# Patient Record
Sex: FEMALE | Race: WHITE
[De-identification: names, ages, dates, MRNs, and addresses within clinical notes are randomized per-mention and may not be internally consistent; named-entity substitution may affect disease eponyms.]

---

## 2019-04-12 ENCOUNTER — HOSPITAL ENCOUNTER (OUTPATIENT)
Dept: HOSPITAL 47 - RADXRMAIN | Age: 84
Discharge: HOME | End: 2019-04-12
Attending: INTERNAL MEDICINE
Payer: MEDICARE

## 2019-04-12 DIAGNOSIS — R05: Primary | ICD-10-CM

## 2019-04-12 PROCEDURE — 71046 X-RAY EXAM CHEST 2 VIEWS: CPT

## 2019-04-12 NOTE — XR
EXAMINATION TYPE: XR chest 2V

 

DATE OF EXAM: 4/12/2019

 

COMPARISON: NONE

 

HISTORY: Shortness of breath

 

TECHNIQUE:  Frontal and lateral views of the chest are obtained.

 

FINDINGS:

 

Scattered senescent parenchymal changes noted. Hyperinflation compatible with COPD. 

 

No evidence for infiltrate. No evidence for atelectasis.

 

Heart size is stable.

 

Mediastinal structures are stable and grossly unremarkable.

 

No evidence for hilar prominence.

 

Degenerative changes dorsal spine. 

 

IMPRESSION:

1. No evidence for acute pulmonary disease.

## 2020-02-17 ENCOUNTER — HOSPITAL ENCOUNTER (OUTPATIENT)
Dept: HOSPITAL 47 - EC | Age: 85
Setting detail: OBSERVATION
LOS: 2 days | Discharge: NURSING, REHAB OR LONG-TERM CARE | End: 2020-02-19
Attending: HOSPITALIST | Admitting: HOSPITALIST
Payer: MEDICARE

## 2020-02-17 DIAGNOSIS — Z80.0: ICD-10-CM

## 2020-02-17 DIAGNOSIS — I27.20: ICD-10-CM

## 2020-02-17 DIAGNOSIS — Z79.01: ICD-10-CM

## 2020-02-17 DIAGNOSIS — Z88.5: ICD-10-CM

## 2020-02-17 DIAGNOSIS — N18.3: ICD-10-CM

## 2020-02-17 DIAGNOSIS — R91.1: ICD-10-CM

## 2020-02-17 DIAGNOSIS — E66.01: ICD-10-CM

## 2020-02-17 DIAGNOSIS — K58.9: ICD-10-CM

## 2020-02-17 DIAGNOSIS — I50.9: ICD-10-CM

## 2020-02-17 DIAGNOSIS — E87.6: ICD-10-CM

## 2020-02-17 DIAGNOSIS — I48.19: ICD-10-CM

## 2020-02-17 DIAGNOSIS — F41.9: ICD-10-CM

## 2020-02-17 DIAGNOSIS — M48.00: ICD-10-CM

## 2020-02-17 DIAGNOSIS — Z95.0: ICD-10-CM

## 2020-02-17 DIAGNOSIS — J84.9: ICD-10-CM

## 2020-02-17 DIAGNOSIS — M17.0: ICD-10-CM

## 2020-02-17 DIAGNOSIS — Z79.899: ICD-10-CM

## 2020-02-17 DIAGNOSIS — D69.6: ICD-10-CM

## 2020-02-17 DIAGNOSIS — Z90.49: ICD-10-CM

## 2020-02-17 DIAGNOSIS — Z98.42: ICD-10-CM

## 2020-02-17 DIAGNOSIS — Z96.1: ICD-10-CM

## 2020-02-17 DIAGNOSIS — J44.9: ICD-10-CM

## 2020-02-17 DIAGNOSIS — N39.3: ICD-10-CM

## 2020-02-17 DIAGNOSIS — I49.5: ICD-10-CM

## 2020-02-17 DIAGNOSIS — E11.22: ICD-10-CM

## 2020-02-17 DIAGNOSIS — Z79.82: ICD-10-CM

## 2020-02-17 DIAGNOSIS — K21.9: ICD-10-CM

## 2020-02-17 DIAGNOSIS — E03.9: ICD-10-CM

## 2020-02-17 DIAGNOSIS — F32.9: ICD-10-CM

## 2020-02-17 DIAGNOSIS — Z98.41: ICD-10-CM

## 2020-02-17 DIAGNOSIS — D64.9: Primary | ICD-10-CM

## 2020-02-17 DIAGNOSIS — Z96.651: ICD-10-CM

## 2020-02-17 PROCEDURE — 86850 RBC ANTIBODY SCREEN: CPT

## 2020-02-17 PROCEDURE — 82607 VITAMIN B-12: CPT

## 2020-02-17 PROCEDURE — 83735 ASSAY OF MAGNESIUM: CPT

## 2020-02-17 PROCEDURE — 83615 LACTATE (LD) (LDH) ENZYME: CPT

## 2020-02-17 PROCEDURE — 86334 IMMUNOFIX E-PHORESIS SERUM: CPT

## 2020-02-17 PROCEDURE — 99285 EMERGENCY DEPT VISIT HI MDM: CPT

## 2020-02-17 PROCEDURE — 83921 ORGANIC ACID SINGLE QUANT: CPT

## 2020-02-17 PROCEDURE — 80053 COMPREHEN METABOLIC PANEL: CPT

## 2020-02-17 PROCEDURE — 84165 PROTEIN E-PHORESIS SERUM: CPT

## 2020-02-17 PROCEDURE — 85730 THROMBOPLASTIN TIME PARTIAL: CPT

## 2020-02-17 PROCEDURE — 86900 BLOOD TYPING SEROLOGIC ABO: CPT

## 2020-02-17 PROCEDURE — 83010 ASSAY OF HAPTOGLOBIN QUANT: CPT

## 2020-02-17 PROCEDURE — 93005 ELECTROCARDIOGRAM TRACING: CPT

## 2020-02-17 PROCEDURE — 83540 ASSAY OF IRON: CPT

## 2020-02-17 PROCEDURE — 87205 SMEAR GRAM STAIN: CPT

## 2020-02-17 PROCEDURE — 82728 ASSAY OF FERRITIN: CPT

## 2020-02-17 PROCEDURE — 82747 ASSAY OF FOLIC ACID RBC: CPT

## 2020-02-17 PROCEDURE — 36415 COLL VENOUS BLD VENIPUNCTURE: CPT

## 2020-02-17 PROCEDURE — 87070 CULTURE OTHR SPECIMN AEROBIC: CPT

## 2020-02-17 PROCEDURE — 83883 ASSAY NEPHELOMETRY NOT SPEC: CPT

## 2020-02-17 PROCEDURE — 85045 AUTOMATED RETICULOCYTE COUNT: CPT

## 2020-02-17 PROCEDURE — 85025 COMPLETE CBC W/AUTO DIFF WBC: CPT

## 2020-02-17 PROCEDURE — 87075 CULTR BACTERIA EXCEPT BLOOD: CPT

## 2020-02-17 PROCEDURE — 85384 FIBRINOGEN ACTIVITY: CPT

## 2020-02-17 PROCEDURE — 97161 PT EVAL LOW COMPLEX 20 MIN: CPT

## 2020-02-17 PROCEDURE — 83550 IRON BINDING TEST: CPT

## 2020-02-17 PROCEDURE — 71045 X-RAY EXAM CHEST 1 VIEW: CPT

## 2020-02-17 PROCEDURE — 87077 CULTURE AEROBIC IDENTIFY: CPT

## 2020-02-17 PROCEDURE — 80048 BASIC METABOLIC PNL TOTAL CA: CPT

## 2020-02-17 PROCEDURE — 86901 BLOOD TYPING SEROLOGIC RH(D): CPT

## 2020-02-17 PROCEDURE — 82164 ANGIOTENSIN I ENZYME TEST: CPT

## 2020-02-17 PROCEDURE — 87186 SC STD MICRODIL/AGAR DIL: CPT

## 2020-02-17 PROCEDURE — 85610 PROTHROMBIN TIME: CPT

## 2020-02-18 LAB
ALBUMIN SERPL-MCNC: 2.9 G/DL (ref 3.5–5)
ALBUMIN SERPL-MCNC: 3.1 G/DL (ref 3.5–5)
ALP SERPL-CCNC: 75 U/L (ref 38–126)
ALP SERPL-CCNC: 97 U/L (ref 38–126)
ALT SERPL-CCNC: 17 U/L (ref 4–34)
ALT SERPL-CCNC: 18 U/L (ref 4–34)
ANION GAP SERPL CALC-SCNC: 3 MMOL/L
ANION GAP SERPL CALC-SCNC: 8 MMOL/L
APTT BLD: 23.5 SEC (ref 22–30)
AST SERPL-CCNC: 27 U/L (ref 14–36)
AST SERPL-CCNC: 30 U/L (ref 14–36)
BASOPHILS # BLD AUTO: 0 K/UL (ref 0–0.2)
BASOPHILS # BLD AUTO: 0 K/UL (ref 0–0.2)
BASOPHILS NFR BLD AUTO: 0 %
BASOPHILS NFR BLD AUTO: 0 %
BUN SERPL-SCNC: 27 MG/DL (ref 7–17)
BUN SERPL-SCNC: 31 MG/DL (ref 7–17)
CALCIUM SPEC-MCNC: 8.5 MG/DL (ref 8.4–10.2)
CALCIUM SPEC-MCNC: 8.6 MG/DL (ref 8.4–10.2)
CHLORIDE SERPL-SCNC: 106 MMOL/L (ref 98–107)
CHLORIDE SERPL-SCNC: 107 MMOL/L (ref 98–107)
CO2 SERPL-SCNC: 27 MMOL/L (ref 22–30)
CO2 SERPL-SCNC: 29 MMOL/L (ref 22–30)
EOSINOPHIL # BLD AUTO: 0 K/UL (ref 0–0.7)
EOSINOPHIL # BLD AUTO: 0.1 K/UL (ref 0–0.7)
EOSINOPHIL NFR BLD AUTO: 0 %
EOSINOPHIL NFR BLD AUTO: 1 %
ERYTHROCYTE [DISTWIDTH] IN BLOOD BY AUTOMATED COUNT: 3.28 M/UL (ref 3.8–5.4)
ERYTHROCYTE [DISTWIDTH] IN BLOOD BY AUTOMATED COUNT: 3.3 M/UL (ref 3.8–5.4)
ERYTHROCYTE [DISTWIDTH] IN BLOOD: 17.6 % (ref 11.5–15.5)
ERYTHROCYTE [DISTWIDTH] IN BLOOD: 17.9 % (ref 11.5–15.5)
FERRITIN SERPL-MCNC: 57 NG/ML (ref 10–291)
FIBRINOGEN PPP-MCNC: 241 MG/DL (ref 200–500)
GLUCOSE SERPL-MCNC: 108 MG/DL (ref 74–99)
GLUCOSE SERPL-MCNC: 111 MG/DL (ref 74–99)
HCT VFR BLD AUTO: 28.4 % (ref 34–46)
HCT VFR BLD AUTO: 28.9 % (ref 34–46)
HGB BLD-MCNC: 8.8 GM/DL (ref 11.4–16)
HGB BLD-MCNC: 9.1 GM/DL (ref 11.4–16)
INR PPP: 2 (ref ?–1.2)
IRON SERPL-MCNC: 7 UG/DL (ref 50–170)
LYMPHOCYTES # SPEC AUTO: 1.8 K/UL (ref 1–4.8)
LYMPHOCYTES # SPEC AUTO: 2.1 K/UL (ref 1–4.8)
LYMPHOCYTES NFR SPEC AUTO: 28 %
LYMPHOCYTES NFR SPEC AUTO: 30 %
MAGNESIUM SPEC-SCNC: 1.8 MG/DL (ref 1.6–2.3)
MCH RBC QN AUTO: 26.8 PG (ref 25–35)
MCH RBC QN AUTO: 27.5 PG (ref 25–35)
MCHC RBC AUTO-ENTMCNC: 30.9 G/DL (ref 31–37)
MCHC RBC AUTO-ENTMCNC: 31.4 G/DL (ref 31–37)
MCV RBC AUTO: 86.6 FL (ref 80–100)
MCV RBC AUTO: 87.6 FL (ref 80–100)
MONOCYTES # BLD AUTO: 0.2 K/UL (ref 0–1)
MONOCYTES # BLD AUTO: 0.3 K/UL (ref 0–1)
MONOCYTES NFR BLD AUTO: 4 %
MONOCYTES NFR BLD AUTO: 5 %
NEUTROPHILS # BLD AUTO: 4.2 K/UL (ref 1.3–7.7)
NEUTROPHILS # BLD AUTO: 4.3 K/UL (ref 1.3–7.7)
NEUTROPHILS NFR BLD AUTO: 60 %
NEUTROPHILS NFR BLD AUTO: 65 %
PLATELET # BLD AUTO: 57 K/UL (ref 150–450)
PLATELET # BLD AUTO: 68 K/UL (ref 150–450)
POTASSIUM SERPL-SCNC: 3.2 MMOL/L (ref 3.5–5.1)
POTASSIUM SERPL-SCNC: 3.5 MMOL/L (ref 3.5–5.1)
PROT SERPL-MCNC: 5.7 G/DL (ref 6.3–8.2)
PROT SERPL-MCNC: 5.9 G/DL (ref 6.3–8.2)
PT BLD: 19.8 SEC (ref 9–12)
SODIUM SERPL-SCNC: 139 MMOL/L (ref 137–145)
SODIUM SERPL-SCNC: 141 MMOL/L (ref 137–145)
TIBC SERPL-MCNC: 320 UG/DL (ref 228–460)
VIT B12 SERPL-MCNC: 391 PG/ML (ref 200–944)
WBC # BLD AUTO: 6.4 K/UL (ref 3.8–10.6)
WBC # BLD AUTO: 7.1 K/UL (ref 3.8–10.6)

## 2020-02-18 RX ADMIN — METOPROLOL TARTRATE SCH MG: 50 TABLET, FILM COATED ORAL at 20:39

## 2020-02-18 RX ADMIN — POTASSIUM CHLORIDE SCH MEQ: 20 TABLET, EXTENDED RELEASE ORAL at 04:49

## 2020-02-18 RX ADMIN — POTASSIUM CHLORIDE SCH MEQ: 20 TABLET, EXTENDED RELEASE ORAL at 03:46

## 2020-02-18 NOTE — XR
EXAMINATION TYPE: XR chest 1V portable

 

DATE OF EXAM: 2/18/2020

 

COMPARISON: NONE

 

HISTORY: 4/12/2019

 

TECHNIQUE: Single view

 

FINDINGS: There is some airspace infiltrate and pleural fluid in the right lower lobe. Heart is enlar
ged. There is a left axillary pacemaker. There is no heart failure. Left lung is clear.

 

IMPRESSION: There is right lower lobe pneumonia and right pleural fluid that appears new compared to 
old exam. No heart failure seen.

## 2020-02-18 NOTE — ED
Recheck HPI





- General


Chief Complaint: Recheck/Abnormal Lab/Rx


Stated Complaint: abnormal labs


Time Seen by Provider: 02/17/20 23:28


Source: EMS


Mode of arrival: EMS


Limitations: no limitations





- History of Present Illness


Initial Comments: 


Liza is an 85-year-old female presents to ER today from [facility] for 

evaluation of newly identified thrombocytopenia and anemia.  Patient is 

currently been undergoing evaluation of a lung mass and recently was diagnosed 

with what she says is a uncurable lung disease however staff at the nursing 

facility reported that she has terminal lung cancer.  Patient reports that over 

the past week she's noticed bruising of her legs and has had generalized 

weakness.  Physician at her facility ordered labs and CBC resulted with new 

onset of anemia and thrombocytopenia of unknown etiology.  Patient is also noted

to be per family hypokalemic with potassium of only 2.9 earlier.  At that time 

patient was transferred to our facility for further evaluation.  Patient does 

admit to feeling pain at her recent biopsy site as well as generalized malaise.








Review of Systems


ROS Statement: 


Those systems with pertinent positive or pertinent negative responses have been 

documented in the HPI.





ROS Other: All systems not noted in ROS Statement are negative.





Past Medical History


Additional Past Medical History / Comment(s): afib, lung cx.


History of Any Multi-Drug Resistant Organisms: None Reported


Additional Past Surgical History / Comment(s): pacemaker


Past Psychological History: No Psychological Hx Reported


Smoking Status: Never smoker


Past Alcohol Use History: None Reported


Past Drug Use History: None Reported





General Exam





- General Exam Comments


Initial Comments: 


Physical Exam


GENERAL:


Elderly chronically ill-appearing female 





HENT:


Normocephalic, Atraumatic. 





EYES:


PERRL, EOMI


Conjunctival pallor





PULMONARY:


Unlabored respirations.  


Crackles on right





CARDIOVASCULAR:


There is a regular rate and rhythm without any murmurs gallops or rubs.  





ABDOMEN:


Soft and nontender with normal bowel sounds. 





SKIN:


Petechia thighs, bruising on bilateral calves


Senile purpura noted on bilateral upper extremities


Patient is pale





: 


Deferred





NEUROLOGIC:


Patient is alert and oriented x3.  Moving all extremities spontaneously





MUSCULOSKELETAL:


Normal extremities with adequate strength and full range of motion.  No lower 

extremity swelling or edema.  No calf tenderness.  





PSYCHIATRIC:


Appropriate situational depression





Limitations: no limitations





Course


                                   Vital Signs











  02/17/20





  23:35


 


Temperature 98.6 F


 


Pulse Rate 66


 


Respiratory 18





Rate 


 


Blood Pressure 147/75


 


O2 Sat by Pulse 96





Oximetry 














Medical Decision Making





- Medical Decision Making


The patient was seen and evaluated history is obtained from the patient and 

review of medical record


History and physical exam concerning for anemia Nuance of thrombus cytopenia


Repeat labs confirmed anemia with thrombocytopenia


Patient will be admitted for further evaluation by oncology








- Lab Data


Result diagrams: 


                                 02/18/20 00:03





                                 02/18/20 00:03


                                   Lab Results











  02/18/20 02/18/20 02/18/20 Range/Units





  00:01 00:03 00:03 


 


WBC    7.1  (3.8-10.6)  k/uL


 


RBC    3.28 L  (3.80-5.40)  m/uL


 


Hgb    8.8 L  (11.4-16.0)  gm/dL


 


Hct    28.4 L  (34.0-46.0)  %


 


MCV    86.6  (80.0-100.0)  fL


 


MCH    26.8  (25.0-35.0)  pg


 


MCHC    30.9 L  (31.0-37.0)  g/dL


 


RDW    17.6 H  (11.5-15.5)  %


 


Plt Count    57 L  (150-450)  k/uL


 


Neutrophils %    60  %


 


Lymphocytes %    30  %


 


Monocytes %    5  %


 


Eosinophils %    0  %


 


Basophils %    0  %


 


Neutrophils #    4.3  (1.3-7.7)  k/uL


 


Lymphocytes #    2.1  (1.0-4.8)  k/uL


 


Monocytes #    0.3  (0-1.0)  k/uL


 


Eosinophils #    0.0  (0-0.7)  k/uL


 


Basophils #    0.0  (0-0.2)  k/uL


 


Manual Slide Review    Performed  


 


Large Platelets    Present  


 


Polychromasia    Present  


 


Hypochromasia    Slight  


 


Poikilocytosis    Slight  


 


Anisocytosis    Slight  


 


Sodium     (137-145)  mmol/L


 


Potassium     (3.5-5.1)  mmol/L


 


Chloride     ()  mmol/L


 


Carbon Dioxide     (22-30)  mmol/L


 


Anion Gap     mmol/L


 


BUN     (7-17)  mg/dL


 


Creatinine     (0.52-1.04)  mg/dL


 


Est GFR (CKD-EPI)AfAm     (>60 ml/min/1.73 sqM)  


 


Est GFR (CKD-EPI)NonAf     (>60 ml/min/1.73 sqM)  


 


Glucose     (74-99)  mg/dL


 


Calcium     (8.4-10.2)  mg/dL


 


Magnesium     (1.6-2.3)  mg/dL


 


Total Bilirubin     (0.2-1.3)  mg/dL


 


AST     (14-36)  U/L


 


ALT     (4-34)  U/L


 


Alkaline Phosphatase     ()  U/L


 


Total Protein     (6.3-8.2)  g/dL


 


Albumin     (3.5-5.0)  g/dL


 


Blood Type   O Positive   


 


Blood Type Confirm  O Positive    


 


Blood Type Recheck   No Previous Record   


 


Bld Type Recheck Status   CABO Indicated   


 


Antibody Screen   NEGATIVE   


 


Spec Expiration Date   02/21/2020 - 2303 02/18/20 Range/Units





  00:03 


 


WBC   (3.8-10.6)  k/uL


 


RBC   (3.80-5.40)  m/uL


 


Hgb   (11.4-16.0)  gm/dL


 


Hct   (34.0-46.0)  %


 


MCV   (80.0-100.0)  fL


 


MCH   (25.0-35.0)  pg


 


MCHC   (31.0-37.0)  g/dL


 


RDW   (11.5-15.5)  %


 


Plt Count   (150-450)  k/uL


 


Neutrophils %   %


 


Lymphocytes %   %


 


Monocytes %   %


 


Eosinophils %   %


 


Basophils %   %


 


Neutrophils #   (1.3-7.7)  k/uL


 


Lymphocytes #   (1.0-4.8)  k/uL


 


Monocytes #   (0-1.0)  k/uL


 


Eosinophils #   (0-0.7)  k/uL


 


Basophils #   (0-0.2)  k/uL


 


Manual Slide Review   


 


Large Platelets   


 


Polychromasia   


 


Hypochromasia   


 


Poikilocytosis   


 


Anisocytosis   


 


Sodium  141  (137-145)  mmol/L


 


Potassium  3.2 L  (3.5-5.1)  mmol/L


 


Chloride  106  ()  mmol/L


 


Carbon Dioxide  27  (22-30)  mmol/L


 


Anion Gap  8  mmol/L


 


BUN  31 H  (7-17)  mg/dL


 


Creatinine  1.21 H  (0.52-1.04)  mg/dL


 


Est GFR (CKD-EPI)AfAm  48  (>60 ml/min/1.73 sqM)  


 


Est GFR (CKD-EPI)NonAf  42  (>60 ml/min/1.73 sqM)  


 


Glucose  111 H  (74-99)  mg/dL


 


Calcium  8.5  (8.4-10.2)  mg/dL


 


Magnesium  1.8  (1.6-2.3)  mg/dL


 


Total Bilirubin  0.6  (0.2-1.3)  mg/dL


 


AST  27  (14-36)  U/L


 


ALT  18  (4-34)  U/L


 


Alkaline Phosphatase  97  ()  U/L


 


Total Protein  5.9 L  (6.3-8.2)  g/dL


 


Albumin  3.1 L  (3.5-5.0)  g/dL


 


Blood Type   


 


Blood Type Confirm   


 


Blood Type Recheck   


 


Bld Type Recheck Status   


 


Antibody Screen   


 


Spec Expiration Date   














Disposition


Clinical Impression: 


 Hypokalemia, Thrombocytopenia, Anemia





Disposition: ADMITTED AS IP TO THIS [facility]


Condition: Serious


Is patient prescribed a controlled substance at d/c from ED?: No


Referrals: 


Wilver Franco MD [Primary Care Provider] - 1-2 days

## 2020-02-19 VITALS — HEART RATE: 74 BPM | SYSTOLIC BLOOD PRESSURE: 122 MMHG | TEMPERATURE: 98 F | DIASTOLIC BLOOD PRESSURE: 74 MMHG

## 2020-02-19 VITALS — RESPIRATION RATE: 20 BRPM

## 2020-02-19 LAB
ALBUMIN SERPL ELPH-MCNC: 2.59 G/DL (ref 3.8–4.9)
ANION GAP SERPL CALC-SCNC: 8 MMOL/L
BASOPHILS # BLD AUTO: 0 K/UL (ref 0–0.2)
BASOPHILS NFR BLD AUTO: 0 %
BUN SERPL-SCNC: 21 MG/DL (ref 7–17)
CALCIUM SPEC-MCNC: 8.4 MG/DL (ref 8.4–10.2)
CHLORIDE SERPL-SCNC: 105 MMOL/L (ref 98–107)
CO2 SERPL-SCNC: 26 MMOL/L (ref 22–30)
EOSINOPHIL # BLD AUTO: 0 K/UL (ref 0–0.7)
EOSINOPHIL NFR BLD AUTO: 1 %
ERYTHROCYTE [DISTWIDTH] IN BLOOD BY AUTOMATED COUNT: 3.29 M/UL (ref 3.8–5.4)
ERYTHROCYTE [DISTWIDTH] IN BLOOD: 17.9 % (ref 11.5–15.5)
GAMMA GLOB SERPL ELPH-MCNC: 0.85 G/DL (ref 0.7–1.5)
GLUCOSE SERPL-MCNC: 110 MG/DL (ref 74–99)
HCT VFR BLD AUTO: 28.7 % (ref 34–46)
HGB BLD-MCNC: 9.1 GM/DL (ref 11.4–16)
INR PPP: 1.7 (ref ?–1.2)
KAPPA LC FREE SER NEPH-MCNC: 3.1 MG/DL (ref 0.33–1.94)
LYMPHOCYTES # SPEC AUTO: 2.6 K/UL (ref 1–4.8)
LYMPHOCYTES NFR SPEC AUTO: 35 %
MCH RBC QN AUTO: 27.7 PG (ref 25–35)
MCHC RBC AUTO-ENTMCNC: 31.8 G/DL (ref 31–37)
MCV RBC AUTO: 87.2 FL (ref 80–100)
MONOCYTES # BLD AUTO: 0.3 K/UL (ref 0–1)
MONOCYTES NFR BLD AUTO: 4 %
NEUTROPHILS # BLD AUTO: 4.2 K/UL (ref 1.3–7.7)
NEUTROPHILS NFR BLD AUTO: 56 %
PLATELET # BLD AUTO: 83 K/UL (ref 150–450)
POTASSIUM SERPL-SCNC: 3.4 MMOL/L (ref 3.5–5.1)
PT BLD: 16.5 SEC (ref 9–12)
SODIUM SERPL-SCNC: 139 MMOL/L (ref 137–145)
WBC # BLD AUTO: 7.5 K/UL (ref 3.8–10.6)

## 2020-02-19 RX ADMIN — IPRATROPIUM BROMIDE SCH: 0.5 SOLUTION RESPIRATORY (INHALATION) at 11:40

## 2020-02-19 RX ADMIN — METOPROLOL TARTRATE SCH MG: 50 TABLET, FILM COATED ORAL at 08:46

## 2020-02-19 RX ADMIN — IPRATROPIUM BROMIDE SCH: 0.5 SOLUTION RESPIRATORY (INHALATION) at 08:23

## 2020-02-19 NOTE — P.DS
Providers


Date of admission: 


02/18/20 02:34





Expected date of discharge: 02/19/20


Attending physician: 


Chago Eastman





Consults: 





                                        





02/18/20 02:35


Consult Physician Urgent 


   Consulting Provider: Ihsan Garcia


   Consult Reason/Comments: thrombocytopenia, anemia recent lung CA diagnosis


   Do you want consulting provider notified?: Yes, Notify in am











Primary care physician: 


Wilver Franco





[facility] Course: 





Presenting complaint:


Bruising





History of presenting complaint:


This is a 86yo patient off Dr. Franco.  Chronic stable medical conditions include

atypical fibrillation, congestive heart failure, COPD, GERD, Jennifer arthritis, 

hypothyroid, interstitial lung disease, secondary probably hypertension, 

osteoarthritis, irritable bowel syndrome, urinary stress incontinence 6 sinus 

syndrome with a pacemaker.


Patient presented with bruising was found to have anemia and thrombocytopenia.  

Patient seen by Dr. Radha mas from hematology.


Today-sitting on the bed.  Comfortable.  No new bruising.  Labs have been 

stable.  I communicated with Venessa samano from oncology hematology team.  

Patient okay to be discharged.  They will follow-up with her in the office.  

This was discussed with the patient.  Questions were answered.


Discussion and discharge planning more than 35 minutes





On examination:


98, 74, 20, 122/74, 93% room air


Lungs-decreased breath sounds


Dermatological-some bruising


Psych-AO - times three.  Mood and affect normal





INVESTIGATIONS, reviewed in the clinical context:


White count 7.5 hemoglobin 9.1 platelets 83 INR 1.7


Potassium 3.4 creatinine 0.87


 vitamin B12 391


Free, power light chain elevated at 3.1


Free lambda light change elevated at 4.12


I am 7, percent saturation 2.1





Assessment:


-Bicytopenia, cause unclear at this point further workup as an outpatient


-Iron deficiency anemia


-Medical debility-uses a walker


-Chronic urinary stress incontinence


-Irritable bowel syndrome


-Hypothyroid


-6 sinus syndrome with pacemaker


-Secondary probably hypertension


-Primary osteoarthritis patient of the knees


-Spinal stenosis causing low back pain


-Interstitial lung disease


-GERD


-Persistent atrial fibrillation chronically on anticoagulation





Plan:


Patient will return to the ECF.  Aspirin is being held.  Follow-up with Dr. Garcia 

and repeat labs as an outpatient.





Patient Condition at Discharge: Stable





Plan - Discharge Summary


Discharge Rx Participant: No


New Discharge Prescriptions: 


Continue


   Potassium Chloride [Klor-Con 20] 20 meq PO AS DIRECTED


   Magnesium Hydroxide [Milk of Magnesia] 2,400 mg PO DAILY PRN


     PRN Reason: Constipation


   Na Phos,M-B/Na Phos,Di-Ba [Fleet Adult] 133 ml RECTAL DAILY PRN


     PRN Reason: Constipation


   Bisacodyl [Dulcolax] 10 mg RECTAL DAILY PRN


     PRN Reason: Constipation


   Acetaminophen Tab [Tylenol] 650 mg PO Q6H PRN


     PRN Reason: general discomfort


   Metoprolol Tartrate [Lopressor] 50 mg PO BID


   Cholecalciferol [Vitamin D3 (25 Mcg = 1000 Iu)] 1,000 unit PO DAILY


   Sertraline [Zoloft] 150 mg PO DAILY


   Pantoprazole Sodium [Protonix] 40 mg PO DAILY@0600


   predniSONE [Deltasone] 20 mg PO DAILY


   Potassium Chloride [Klor-Con 10] 10 meq PO DAILY@1700


   Losartan [Cozaar] 50 mg PO DAILY


   Atorvastatin [Lipitor] 10 mg PO HS


   Levothyroxine Sodium [Levoxyl] 88 mcg PO DAILY@0600


   Furosemide [Lasix] 20 mg PO DAILY


   Digoxin [Lanoxin] 125 mcg PO DAILY@0600


   Warfarin [Coumadin] 2.5 mg PO DAILY@1700


   Diltiazem HCl [Cardizem LA] 180 mg PO DAILY


   Umeclidinium Brm/Vilanterol Tr [Anoro Ellipta 62.5-25 Mcg INH] 1 puff 

INHALATION DAILY


   Phenyleph/Pramoxin/Glycr/W.pet [Preparation H Cream] 1 applic RECTAL QID PRN


     PRN Reason: Hemorrhoids





Discontinued


   Aspirin 81 mg PO DAILY


Discharge Medication List





Acetaminophen Tab [Tylenol] 650 mg PO Q6H PRN 02/18/20 [History]


Atorvastatin [Lipitor] 10 mg PO HS 02/18/20 [History]


Bisacodyl [Dulcolax] 10 mg RECTAL DAILY PRN 02/18/20 [History]


Cholecalciferol [Vitamin D3 (25 Mcg = 1000 Iu)] 1,000 unit PO DAILY 02/18/20 

[History]


Digoxin [Lanoxin] 125 mcg PO DAILY@0600 02/18/20 [History]


Diltiazem HCl [Cardizem LA] 180 mg PO DAILY 02/18/20 [History]


Furosemide [Lasix] 20 mg PO DAILY 02/18/20 [History]


Levothyroxine Sodium [Levoxyl] 88 mcg PO DAILY@0600 02/18/20 [History]


Losartan [Cozaar] 50 mg PO DAILY 02/18/20 [History]


Magnesium Hydroxide [Milk of Magnesia] 2,400 mg PO DAILY PRN 02/18/20 [History]


Metoprolol Tartrate [Lopressor] 50 mg PO BID 02/18/20 [History]


Na Phos,M-B/Na Phos,Di-Ba [Fleet Adult] 133 ml RECTAL DAILY PRN 02/18/20 

[History]


Pantoprazole Sodium [Protonix] 40 mg PO DAILY@0600 02/18/20 [History]


Phenyleph/Pramoxin/Glycr/W.pet [Preparation H Cream] 1 applic RECTAL QID PRN 02/ 18/20 [History]


Potassium Chloride [Klor-Con 10] 10 meq PO DAILY@1700 02/18/20 [History]


Potassium Chloride [Klor-Con 20] 20 meq PO AS DIRECTED 02/18/20 [History]


Sertraline [Zoloft] 150 mg PO DAILY 02/18/20 [History]


Umeclidinium Brm/Vilanterol Tr [Anoro Ellipta 62.5-25 Mcg INH] 1 puff INHALATION

DAILY 02/18/20 [History]


Warfarin [Coumadin] 2.5 mg PO DAILY@1700 02/18/20 [History]


predniSONE [Deltasone] 20 mg PO DAILY 02/18/20 [History]








Follow up Appointment(s)/Referral(s): 


Ihsan Garcia MD [STAFF PHYSICIAN] - 1 Week


Wilver Franco MD [Primary Care Provider] - 1 Week


Patient Instructions/Handouts:  Anemia (DC), Thrombocytopenia (DC)


Activity/Diet/Wound Care/Special Instructions: 


cbc  - next monday/dr garcia


Discharge Disposition: TRANSFER TO SNF/ECF

## 2024-03-30 NOTE — P.CONS
History of Present Illness





- Reason for Consult


Consult date: 20


bicytopenia


Requesting physician: Clarice Sheldon





- Chief Complaint


abnormal lab





- History of Present Illness





Mrs. Perry is a very pleasant 85-year-old  female patient sent from 

[facility] that she was staying at for rehabilitation due to recent 

frequent hospitalizations.  Recently had pneumonia, had lung biopsy, with a 

diagnosis of lung "disease".  Patient and her  both confirm that she was 

not diagnosed with cancer but, they could not remember the name of the disease. 

The specimen was sent to [facility] from [facility].  Patient denied any 

fevers, chills, illness other then the pneumonia, no unusual weight loss, 

shortness of breath, cough, difficulty breathing, ear nose or throat pain, acute

changes in oral intake, abdominal pain, acute changes in bowel or bladder 

habits.  She has never been told she has had low counts before.Patient feels 

well, is not sure why she is here.





Review of Systems





14 point review of systems is negative except as stated in HPI





Past Medical History


Past Medical History: Atrial Fibrillation, Blood Disorder, Heart Failure, COPD, 

GERD/Reflux, Osteoarthritis (OA), Pneumonia, Renal Disease, Respiratory 

Disorder, Thyroid Disorder


Additional Past Medical History / Comment(s): Newly identified thrombocytopenia 

and anemia, hypokalemia, recent diagnosis of "incurable lung disease" per pt, 

SOB with any exertion, interstitial lung disease, pulmonary HTN, arthritis 

bilateral knees, spinal stenosis/occasional low back pain, SSS with pacer, 

hypothyroid, IBS, DKD stage III, stress urine incontinence.


History of Any Multi-Drug Resistant Organisms: None Reported


Past Surgical History: Appendectomy, Cholecystectomy, Hysterectomy, Joint 

Replacement, Pacemaker, Tubal Ligation, Uterine Ablation


Additional Past Surgical History / Comment(s): R lung surgery/bx with chest 

tube, colonoscopies, total R knee arthroplasty, D&Cs, bilateral cataract 

removals/lens implants.


Past Anesthesia/Blood Transfusion Reactions: No Reported Reaction


Additional Past Anesthesia/Blood Transfusion Reaction / Comm: Pt has received 

blood in past without reaction.


Type of Cardiac Device: Permanent Pacemaker


Device Placement Date:: 


Smoking Status: Never smoker





- Past Family History


  ** Father


Family Medical History: Cancer


Additional Family Medical History / Comment(s): Father  of stomach cancer.





  ** Mother


Family Medical History: No Reported History


Additional Family Medical History / Comment(s): Mother is .





Medications and Allergies


                                Home Medications











 Medication  Instructions  Recorded  Confirmed  Type


 


Acetaminophen Tab [Tylenol] 650 mg PO Q6H PRN 20 History


 


Aspirin 81 mg PO DAILY 20 History


 


Atorvastatin [Lipitor] 10 mg PO HS 20 History


 


Bisacodyl [Dulcolax] 10 mg RECTAL DAILY PRN 20 History


 


Cholecalciferol [Vitamin D3 (25 1,000 unit PO DAILY 20 History





Mcg = 1000 Iu)]    


 


Digoxin [Lanoxin] 125 mcg PO DAILY@0600 20 History


 


Diltiazem HCl [Cardizem LA] 180 mg PO DAILY 20 History


 


Furosemide [Lasix] 20 mg PO DAILY 20 History


 


Levothyroxine Sodium [Levoxyl] 88 mcg PO DAILY@0600 20 History


 


Losartan [Cozaar] 50 mg PO DAILY 20 History


 


Magnesium Hydroxide [Milk of 2,400 mg PO DAILY PRN 20 History





Magnesia]    


 


Metoprolol Tartrate [Lopressor] 50 mg PO BID 20 History


 


Na Phos,M-B/Na Phos,Di-Ba [Fleet 133 ml RECTAL DAILY PRN 20 

History





Adult]    


 


Pantoprazole Sodium [Protonix] 40 mg PO DAILY@0600 20 History


 


Phenyleph/Pramoxin/Glycr/W.pet 1 applic RECTAL QID PRN 20 History





[Preparation H Cream]    


 


Potassium Chloride [Klor-Con 10] 10 meq PO DAILY@1700 20 History


 


Potassium Chloride [Klor-Con 20] 20 meq PO AS DIRECTED 20 History


 


Sertraline [Zoloft] 150 mg PO DAILY 20 History


 


Umeclidinium Brm/Vilanterol Tr 1 puff INHALATION DAILY 20 History





[Anoro Ellipta 62.5-25 Mcg INH]    


 


Warfarin [Coumadin] 2.5 mg PO DAILY@1700 20 History


 


predniSONE [Deltasone] 20 mg PO DAILY 20 History








                                    Allergies











Allergy/AdvReac Type Severity Reaction Status Date / Time


 


codeine AdvReac  Nausea/Dizz Verified 20 07:14





   iness  














Physical Exam


Vitals: 


                                   Vital Signs











  Temp Pulse Pulse Resp BP BP Pulse Ox


 


 20 17:45  97.6 F   87  17   178/72  96


 


 20 13:00  98.2 F   79  16   150/72  93 L


 


 20 08:32  97.5 F L   76    165/66 


 


 20 05:34  97.8 F  73   19  139/74   99


 


 20 04:51   71   18  118/70   99


 


 20 02:38   70   18  136/64   98


 


 20 01:38   68   18  135/63   97


 


 20 00:38   70   18  129/66   98


 


 20 23:35  98.6 F  66   18  147/75   96








                                Intake and Output











 20





 06:59 14:59 22:59


 


Other:   


 


  # Voids  1 


 


  Weight 90.718 kg 90.718 kg 














- Constitutional


General appearance: cooperative, morbidly obese, no acute distress





- EENT


Eyes: anicteric sclerae, EOMI


ENT: hearing grossly normal, normal oropharynx





- Neck


Neck: no lymphadenopathy





- Respiratory


Respiratory: bilateral: CTA





- Cardiovascular


Rhythm: regular


Heart sounds: normal: S1, S2


Abnormal Heart Sounds: no systolic murmur, no diastolic murmur, no rub, no S3 

Gallop, no S4 Gallop, no click, no other


  ** leg


Peripheral Edema: bilateral: None





- Gastrointestinal


General gastrointestinal: no absent bowel sounds, no decreased bowel sounds, no 

distended, no hepatomegaly, no hyperactive bowel sounds, normal bowel sounds, no

 organomegaly, no rigid, no scaphoid, soft, no splenomegaly, no tenderness, no 

umbilical hernia, no ventral hernia





- Integumentary





Bruises noted on the bilateral lower extremities, petechiae on the shins


Integumentary: pale





- Neurologic


Neurologic: CNII-XII intact





- Musculoskeletal


Musculoskeletal: generalized weakness





- Psychiatric


Psychiatric: A&O x's 3, appropriate affect, intact judgment & insight





Results


CBC & Chem 7: 


                                 20 15:32





                                 20 15:32


Labs: 


                  Abnormal Lab Results - Last 24 Hours (Table)











  20 Range/Units





  00:03 00:03 09:23 


 


RBC  3.28 L    (3.80-5.40)  m/uL


 


Hgb  8.8 L    (11.4-16.0)  gm/dL


 


Hct  28.4 L    (34.0-46.0)  %


 


MCHC  30.9 L    (31.0-37.0)  g/dL


 


RDW  17.6 H    (11.5-15.5)  %


 


Plt Count  57 L    (150-450)  k/uL


 


PT     (9.0-12.0)  sec


 


INR     (<1.2)  


 


Potassium   3.2 L   (3.5-5.1)  mmol/L


 


BUN   31 H   (7-17)  mg/dL


 


Creatinine   1.21 H   (0.52-1.04)  mg/dL


 


Glucose   111 H   (74-99)  mg/dL


 


Iron    7 L  ()  ug/dL


 


% Saturation    2.19 L  (12.00-45.00)   


 


Lactate Dehydrogenase     (313-618)  U/L


 


Total Protein   5.9 L   (6.3-8.2)  g/dL


 


Total Protein (PEP)     (6.2-8.2)  g/dL


 


Albumin   3.1 L   (3.5-5.0)  g/dL














  20 Range/Units





  09:23 09:23 09:23 


 


RBC     (3.80-5.40)  m/uL


 


Hgb     (11.4-16.0)  gm/dL


 


Hct     (34.0-46.0)  %


 


MCHC     (31.0-37.0)  g/dL


 


RDW     (11.5-15.5)  %


 


Plt Count     (150-450)  k/uL


 


PT   19.8 H   (9.0-12.0)  sec


 


INR   2.0 H   (<1.2)  


 


Potassium     (3.5-5.1)  mmol/L


 


BUN     (7-17)  mg/dL


 


Creatinine     (0.52-1.04)  mg/dL


 


Glucose     (74-99)  mg/dL


 


Iron     ()  ug/dL


 


% Saturation     (12.00-45.00)   


 


Lactate Dehydrogenase    1018 H  (313-618)  U/L


 


Total Protein     (6.3-8.2)  g/dL


 


Total Protein (PEP)  5.0 L    (6.2-8.2)  g/dL


 


Albumin     (3.5-5.0)  g/dL














  20 Range/Units





  15:32 15:32 


 


RBC  3.30 L   (3.80-5.40)  m/uL


 


Hgb  9.1 L   (11.4-16.0)  gm/dL


 


Hct  28.9 L   (34.0-46.0)  %


 


MCHC    (31.0-37.0)  g/dL


 


RDW  17.9 H   (11.5-15.5)  %


 


Plt Count  68 L   (150-450)  k/uL


 


PT    (9.0-12.0)  sec


 


INR    (<1.2)  


 


Potassium    (3.5-5.1)  mmol/L


 


BUN   27 H  (7-17)  mg/dL


 


Creatinine    (0.52-1.04)  mg/dL


 


Glucose   108 H  (74-99)  mg/dL


 


Iron    ()  ug/dL


 


% Saturation    (12.00-45.00)   


 


Lactate Dehydrogenase    (313-618)  U/L


 


Total Protein   5.7 L  (6.3-8.2)  g/dL


 


Total Protein (PEP)    (6.2-8.2)  g/dL


 


Albumin   2.9 L  (3.5-5.0)  g/dL











Chest x-ray: report reviewed (right lower lobe pneumonia, pleural effusion)





Assessment and Plan


(1) Anemia


Current Visit: Yes   Status: Acute   Priority: High   Code(s): D64.9 - ANEMIA, 

UNSPECIFIED   SNOMED Code(s): 610171708


   





(2) Thrombocytopenia


Current Visit: Yes   Status: Acute   Priority: High   Code(s): D69.6 - 

THROMBOCYTOPENIA, UNSPECIFIED   SNOMED Code(s): 492689537


   


Plan: 





New onset for the patient.  No need to for transfusion at this time.  CBC daily 

and when necessary.





Pancytopenia workup, hemolysis workup ordered.





Patient is on Coumadin for atrial fibrillation, she states her last INR was 

therapeutic between 2 and 3.  This is on hold due to concerns for acute bleeding

 and acute drop in hemoglobin.
[id number]